# Patient Record
Sex: FEMALE | Race: OTHER | NOT HISPANIC OR LATINO | Employment: UNEMPLOYED | URBAN - METROPOLITAN AREA
[De-identification: names, ages, dates, MRNs, and addresses within clinical notes are randomized per-mention and may not be internally consistent; named-entity substitution may affect disease eponyms.]

---

## 2017-01-04 ENCOUNTER — POSTPARTUM VISIT (OUTPATIENT)
Dept: OBSTETRICS AND GYNECOLOGY | Facility: CLINIC | Age: 22
End: 2017-01-04

## 2017-01-04 VITALS
SYSTOLIC BLOOD PRESSURE: 129 MMHG | HEIGHT: 62 IN | BODY MASS INDEX: 30.91 KG/M2 | DIASTOLIC BLOOD PRESSURE: 71 MMHG | HEART RATE: 79 BPM | WEIGHT: 168 LBS

## 2017-01-04 DIAGNOSIS — Z98.891 STATUS POST CESAREAN DELIVERY: ICD-10-CM

## 2017-01-04 DIAGNOSIS — Z09 POSTOPERATIVE EXAMINATION: Primary | ICD-10-CM

## 2017-01-04 PROCEDURE — 99213 OFFICE O/P EST LOW 20 MIN: CPT | Performed by: OBSTETRICS & GYNECOLOGY

## 2017-01-04 NOTE — PROGRESS NOTES
"Wali Duckworth is a 21 y.o. female   Pt here for 6wk pp. Pt states does not want bc.  History of Present Illness  Pt here for 6wk pp. Pt states does not want bc.  She is 6 weeks status post a primary  section for arrest of labor.  She is doing well today.  She denies any significant pain.  She has started her menses.  She reports her bleeding is heavy, but normal.  She has not been sexually active.  She is not breast-feeding.  She does not desire to start on birth control.  She plans to use abstinence.    The following portions of the patient's history were reviewed and updated as appropriate: allergies, current medications, past family history, past medical history, past social history, past surgical history and problem list.    Review of Systems  General: No fever or chills  Constitutional: No weight loss or gain, no hair loss  HENT: No headache, no hearing loss, no tinnitus  Eyes: normal vision, no eye pain  Lungs: No cough, no shortness incision well healed without erythema or drainage of breath  Heart: No chest pain, no palpitations  Abdomen: No nausea, vomiting, constipation or diarrhea  : No dysuria, no hematuria  Skin: No rashes  Lymph: No swelling  Neuro: No parathesia, no weakness  Psych: Normal though content, no hallucinations, no SI/HI    Objective   Physical Exam  Vitals:    17 1501   BP: 129/71   Pulse: 79   Weight: 168 lb (76.2 kg)   Height: 62\" (157.5 cm)   Gen: No acute distress, awake and oriented times three  Abdomen: soft, nontender, non distended, normoactive bowel sounds  Incision well healed without erythema or drainage  Pelvic:  Deferred  Psych: Good judgement and insight, normal affect and mood      Assessment/Plan   Diagnoses and all orders for this visit:    Postoperative examination    Status post  delivery    Patient may resume normal activities.  The patient is advised that if she is to become sexually active she should at the very least use condoms " as a method of birth control.  She may also come in to see me to discuss forms of contraception if she wishes in the future.  Otherwise, I would plan to see her back in one year.  She verbalizes understanding.    I spent 10 out of 15 minutes with the patient in face to face counseling of the above issues.

## 2017-01-04 NOTE — MR AVS SNAPSHOT
Rafaela Duckworth   2017 3:00 PM   Postpartum Visit    Dept Phone:  [phone]   Encounter #:  [id number]    Provider:  Jaylon Lovell MD   Department:  [facility]                Your Full Care Plan              Your Updated Medication List          This list is accurate as of: 17  3:28 PM.  Always use your most recent med list.                ferrous sulfate 325 (65 FE) MG tablet   Take 1 tablet by mouth 2 (Two) Times a Day With Meals.       prenatal (CLASSIC) vitamin 28-0.8 MG tablet tablet   Take 1 tablet by mouth Daily. Do not take at same time with vitamin               You Were Diagnosed With        Codes Comments    Postoperative examination    -  Primary ICD-10-CM: Z09  ICD-9-CM: V67.00     Status post  delivery     ICD-10-CM: Z98.891  ICD-9-CM: V45.89       Instructions     None    Patient Instructions History      Upcoming Appointments     Visit Type Date Time Department    POSTPARTUM 2017  3:00 PM TIP CALDERON      [employer] Signup     [employer] allows you to send messages to your doctor, view your test results, renew your prescriptions, schedule appointments, and more. To sign up, go to [employer] and click on the Sign Up Now link in the New User? box. Enter your [employer] Activation Code exactly as it appears below along with the last four digits of your Social Security Number and your Date of Birth () to complete the sign-up process. If you do not sign up before the expiration date, you must request a new code.    [employer] Activation Code: [email or web address]  Expires: 2017  3:28 PM    If you have questions, you can email [email or web address] or call [phone] to talk to our [employer] staff. Remember, [employer] is NOT to be used for urgent needs. For medical emergencies, dial 911.               Other Info from Your Visit           Allergies     No Known Allergies      Vital  "Signs     Blood Pressure Pulse Height Weight Body Mass Index Smoking Status    129/71 (98 %/ 79 %)* 79 62\" (157.5 cm) (18 %, Z= -0.90)† 168 lb (76.2 kg) (91 %, Z= 1.34)† 30.73 kg/m2 (94 %, Z= 1.57)† Never Smoker    *BP percentiles are based on NHBPEP's 4th Report    †Growth percentiles are based on CDC 2-20 Years data.      Problems and Diagnoses Noted     Encounter for examination following surgery    Postpartum follow-up    Status post         "

## 2017-07-25 ENCOUNTER — OFFICE VISIT (OUTPATIENT)
Dept: OBSTETRICS AND GYNECOLOGY | Facility: CLINIC | Age: 22
End: 2017-07-25

## 2017-07-25 VITALS
DIASTOLIC BLOOD PRESSURE: 74 MMHG | BODY MASS INDEX: 32.39 KG/M2 | SYSTOLIC BLOOD PRESSURE: 118 MMHG | WEIGHT: 176 LBS | HEIGHT: 62 IN | HEART RATE: 76 BPM

## 2017-07-25 DIAGNOSIS — N91.1 SECONDARY AMENORRHEA: ICD-10-CM

## 2017-07-25 DIAGNOSIS — Z30.09 BIRTH CONTROL COUNSELING: Primary | ICD-10-CM

## 2017-07-25 LAB
B-HCG UR QL: NEGATIVE
INTERNAL NEGATIVE CONTROL: NEGATIVE
INTERNAL POSITIVE CONTROL: POSITIVE
Lab: NORMAL

## 2017-07-25 PROCEDURE — 99214 OFFICE O/P EST MOD 30 MIN: CPT | Performed by: OBSTETRICS & GYNECOLOGY

## 2017-07-25 PROCEDURE — 81025 URINE PREGNANCY TEST: CPT | Performed by: OBSTETRICS & GYNECOLOGY

## 2017-07-25 RX ORDER — MEDROXYPROGESTERONE ACETATE 5 MG/1
5 TABLET ORAL DAILY
Qty: 10 TABLET | Refills: 0 | Status: SHIPPED | OUTPATIENT
Start: 2017-07-25 | End: 2017-08-04

## 2017-07-25 NOTE — PROGRESS NOTES
"Wali Duckworth is a 22 y.o. female   CC: Pt here for bc consult.  History of Present Illness  Pt here for bc consult.  After her most recent delivery, the patient had declined starting on birth control.  At this time, she reports that she would like to start on birth control.  Her last menses was in May 2017.  She was sexually active about 1 month ago.  She has not had a positive home pregnancy test.  She denies any signs or symptoms of depression.  Typically, she denies irregular menses.  She has never been on birth control in the past.    The following portions of the patient's history were reviewed and updated as appropriate: allergies, current medications, past family history, past medical history, past social history, past surgical history and problem list.    Review of Systems  General: No fever or chills  Constitutional: No weight loss or gain, no hair loss  HENT: No headache, no hearing loss, no tinnitus  Heart: No chest pain, no palpitations  Abdomen: No nausea, vomiting, constipation or diarrhea  : No dysuria, no hematuria  Psych: Normal though content, no hallucinations, no SI/HI    Objective   Physical Exam  Vitals:    07/25/17 1341   BP: 118/74   Pulse: 76   Weight: 176 lb (79.8 kg)   Height: 62\" (157.5 cm)   Patient's last menstrual period was 05/28/2017 (approximate).   Gen: No acute distress, awake and oriented times three  Abdomen: soft, nontender, non distended, normoactive bowel sounds  Pelvic: Deferred  Psych: Good judgement and insight, normal affect and mood    Labs: Office pregnancy test today is negative    Assessment/Plan   Diagnoses and all orders for this visit:    Birth control counseling  -     POC Pregnancy, Urine    Secondary amenorrhea  -     HCG, B-subunit, Quantitative  -     Prolactin  -     TSH Rfx On Abnormal To Free T4  -     Testosterone Free Direct  -     Testosterone  -     Estradiol  -     Follicle Stimulating Hormone  -     medroxyPROGESTERone (PROVERA) 5 MG " tablet; Take 1 tablet by mouth Daily for 10 days.    Various contraceptive options have been discussed with the patient at length.  We discussed birth control pills, patches, rings, Depo-Provera, long-acting reversible contraception, condom usage, natural family planning, abstinence.  The patient would like to proceed with intrauterine device.  The risks, benefits, alternatives, and side effects were discussed.  Educational handouts on both Mirena and ParaGard were given to the patient.  The patient is interested in a Mirena.  Patient has not had a menstrual period since May of this year.  We'll perform a quantitative hCG to be sure to exclude pregnancy at this time.  If that test is negative, I would have the patient start on progesterone withdrawal challenge, and then plan to come into the office next week for Mirena insertion which she will likely be on her menses.  I explained to the patient that if she is not on her menses, she may still come into the office to possibly have placement, if her pregnancy test is negative.  Patient verbalizes understanding and agrees with the plan.  We will also perform basic laboratory evaluation today to look into I the patient has not had a period in 2 months.  Patient has somewhat of a clinical appearance of polycystic ovarian syndrome, and this would be the most likely etiology.  Return to the office in 1-2 weeks for likely Mirena insertion.    I spent 20 out of 25 minutes with the patient in face to face counseling of the above issues.

## 2017-07-26 LAB
ESTRADIOL SERPL-MCNC: 36.5 PG/ML
FSH SERPL-ACNC: 6.5 MIU/ML
HCG INTACT+B SERPL-ACNC: 0.56 MIU/ML
PROLACTIN SERPL-MCNC: 7.2 NG/ML (ref 4.8–23.3)
TESTOST SERPL-MCNC: 28 NG/DL (ref 8–48)
TSH SERPL DL<=0.005 MIU/L-ACNC: 1.16 MIU/ML (ref 0.27–4.2)

## 2017-07-27 LAB — TESTOST FREE SERPL-MCNC: 1.2 PG/ML (ref 0–4.2)

## 2017-07-28 ENCOUNTER — TELEPHONE (OUTPATIENT)
Dept: OBSTETRICS AND GYNECOLOGY | Facility: CLINIC | Age: 22
End: 2017-07-28

## 2017-07-28 NOTE — TELEPHONE ENCOUNTER
----- Message from Jaylon Lovell MD sent at 7/26/2017  1:21 PM EDT -----  Let the patient know that her bloodwork was normal

## 2017-07-31 ENCOUNTER — TELEPHONE (OUTPATIENT)
Dept: OBSTETRICS AND GYNECOLOGY | Facility: CLINIC | Age: 22
End: 2017-07-31

## 2017-08-08 ENCOUNTER — TELEPHONE (OUTPATIENT)
Dept: OBSTETRICS AND GYNECOLOGY | Facility: CLINIC | Age: 22
End: 2017-08-08

## 2018-02-19 ENCOUNTER — LAB REQUISITION (OUTPATIENT)
Dept: LAB | Facility: OTHER | Age: 23
End: 2018-02-19

## 2018-02-19 DIAGNOSIS — Z00.00 ANNUAL PHYSICAL EXAM: ICD-10-CM

## 2018-02-19 PROCEDURE — 86765 RUBEOLA ANTIBODY: CPT | Performed by: PHYSICAL MEDICINE & REHABILITATION

## 2018-02-19 PROCEDURE — 86735 MUMPS ANTIBODY: CPT | Performed by: PHYSICAL MEDICINE & REHABILITATION

## 2018-02-19 PROCEDURE — 86762 RUBELLA ANTIBODY: CPT | Performed by: PHYSICAL MEDICINE & REHABILITATION

## 2018-02-19 PROCEDURE — 86787 VARICELLA-ZOSTER ANTIBODY: CPT | Performed by: PHYSICAL MEDICINE & REHABILITATION

## 2018-02-20 LAB
MEV IGG SER IA-ACNC: POSITIVE
MUV IGG SER IA-ACNC: NEGATIVE
RUBV IGG SERPL IA-ACNC: NORMAL
VZV IGG SER IA-ACNC: POSITIVE